# Patient Record
Sex: FEMALE
[De-identification: names, ages, dates, MRNs, and addresses within clinical notes are randomized per-mention and may not be internally consistent; named-entity substitution may affect disease eponyms.]

---

## 2018-09-04 ENCOUNTER — HOSPITAL ENCOUNTER (OUTPATIENT)
Dept: HOSPITAL 14 - H.ENDO | Age: 38
Discharge: HOME | End: 2018-09-04
Attending: INTERNAL MEDICINE
Payer: COMMERCIAL

## 2018-09-04 VITALS — SYSTOLIC BLOOD PRESSURE: 102 MMHG | DIASTOLIC BLOOD PRESSURE: 67 MMHG | HEART RATE: 71 BPM | RESPIRATION RATE: 12 BRPM

## 2018-09-04 VITALS — OXYGEN SATURATION: 100 % | TEMPERATURE: 96.9 F

## 2018-09-04 DIAGNOSIS — K31.89: Primary | ICD-10-CM

## 2018-09-04 DIAGNOSIS — K44.9: ICD-10-CM

## 2018-09-04 DIAGNOSIS — K29.50: ICD-10-CM

## 2018-09-04 DIAGNOSIS — R12: ICD-10-CM

## 2018-09-04 PROCEDURE — 88305 TISSUE EXAM BY PATHOLOGIST: CPT

## 2018-09-04 PROCEDURE — 43239 EGD BIOPSY SINGLE/MULTIPLE: CPT

## 2018-12-13 ENCOUNTER — HOSPITAL ENCOUNTER (EMERGENCY)
Dept: HOSPITAL 31 - C.ER | Age: 38
Discharge: HOME | End: 2018-12-13
Payer: COMMERCIAL

## 2018-12-13 VITALS — TEMPERATURE: 97.9 F | OXYGEN SATURATION: 100 %

## 2018-12-13 VITALS — DIASTOLIC BLOOD PRESSURE: 69 MMHG | HEART RATE: 84 BPM | SYSTOLIC BLOOD PRESSURE: 121 MMHG | RESPIRATION RATE: 18 BRPM

## 2018-12-13 DIAGNOSIS — S33.5XXA: Primary | ICD-10-CM
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
